# Patient Record
Sex: MALE | Race: WHITE | URBAN - METROPOLITAN AREA
[De-identification: names, ages, dates, MRNs, and addresses within clinical notes are randomized per-mention and may not be internally consistent; named-entity substitution may affect disease eponyms.]

---

## 2017-07-12 ENCOUNTER — THERAPY VISIT (OUTPATIENT)
Dept: PHYSICAL THERAPY | Facility: CLINIC | Age: 42
End: 2017-07-12
Payer: COMMERCIAL

## 2017-07-12 DIAGNOSIS — M25.511 ACUTE PAIN OF RIGHT SHOULDER: Primary | ICD-10-CM

## 2017-07-12 PROCEDURE — 97162 PT EVAL MOD COMPLEX 30 MIN: CPT | Mod: GP | Performed by: PHYSICAL THERAPIST

## 2017-07-12 PROCEDURE — 97110 THERAPEUTIC EXERCISES: CPT | Mod: GP | Performed by: PHYSICAL THERAPIST

## 2017-07-12 NOTE — PROGRESS NOTES
[facility] Initial Evaluation - Upper Extremity     Evaluation Date:  07/12/17  Referral: Dr Velez @ [facility]  Employment:  Marketing communications  Employment Status: full-time  Work Mechanical Stresses:  Prolonged sitting, computer work  Leisure Mechanical Stresses: table tennis, skateboarding, mountain biking, snowboarding  Baselines/Functional Disability: unable to lift or pour gallon of milk, unable to sleep on (L) side, unable to raise arm up out to the side   VAS Score (0-10):  6      HISTORY:   Patient presents with:  Intermittent sharp and achy pain  (Constant/Intermittent, Dull/Achy/Sharp/Stabbing/Throbbing)    Pain location:  Right lateral shoulder   Radiates to:  Upper arm occasionally   Paraesthesia:  no    Present Since: 5 weeks  Commenced as a result of: tim:  Dove for a ball and landed with his arm extended forwards  Status:  New and improving   (new/recurrent/chronic) and (improving/not improving/worsening)   Symptoms at onset: shoulder  Constant symptoms:  none  Intermittent symptoms: shoulder    Symptoms are made worse with: lifting, reaching overhead, lying on (L) side  (bending, sitting, turning neck, dressing, reaching, gripping; am, as the day progresses, pm; when still, when on the move; sleeping: prone, supine, side (R), side (L); other)   Symptoms are made better with:  rest  (bending, sitting, turning neck, dressing, reaching, gripping; am, as the day progresses, pm; when still, when on the move; sleeping: prone, supine, side (R), side (L); other)   Continued use makes the pain:  worse  (better, worse, no effect)  Disturbed night:  yes  Pain at rest:  yes  Previous Episodes:  0  Previous Treatments:  N/a  Handedness:  right    Specific Questions (as reported by the patient):  Do you have pain with coughing or sneezing:  no  Overall General Health:  Good/excellent  Imaging/Special testing:  MRI:  Labral tear from 12 o'clock to 3 o'clock  Recent or major surgery:   "Heart - arrhythmia surgery  Do you have night pain:  yes  Have you had any recent accidents:  no  Have you experienced any unexplained weight loss:  no  Pertinent medical history includes: none  Medical allergies includes: none  Current medications:  none  Barriers at home: unable to lift gallon of milk or anything with (R) hand   Red Flags:  no      Site(s) for physical examination: (R) shoulder       OBJECTIVE EXAMINATION:         AROM:  (Pain during motion: PDM; End-range Pain:  ERP)  MOVEMENT LOSS Right Left Pain   Flexion  WNL  ERP   Abduction  WNL  PDM at 90   External Rotation      Internal Rotation      Extension  75  ERP   Flexion/IR WNL  -   Extension/ER Lacking 1\" compared to (L)  ERP     PROM:    (Pain during motion: PDM; End-range Pain:  ERP)  MOVEMENT LOSS Right Left Pain   Flexion  WNL  ERP   Abduction   deg  PDM   External Rotation WNL  -   Internal Rotation 70  ERP     Resisted Testing:  (Pain during motion: PDM; End-range Pain:  ERP)   Right Left Pain   Flexion  5-  +   Abduction  deferred     External Rotation 5  -   Internal Rotation 5  -   Extension         Repeated Tests:     Pre-test symptoms:  Shoulder 6/10       Symptoms during        Symptoms after               Movement                   Movement:         Mechanical response:      Inc ROM Dec ROM  No effect   Extension P stretch NE     x   Flexion        Ext/IR P stretch NE X abd     Adduction         ER (in 90 deg flex)          Spine:  Movement Loss:  Not assessed   Spine testing:   Not relevant    Provisional Classification:  Peripheral:  Derangement/Other    Principle of Management:   Repeated shoulder ext/IR with belt: 4-5x/day; 10-15 reps       HPI                  System    Physical Exam    General     ROS    Assessment/Plan:      Patient is a 41 year old male with right side shoulder complaints.    Patient has the following significant findings with corresponding treatment plan.                Diagnosis 1:  Right Shoulder " Labral Tear   Pain -  manual therapy, self management, education, directional preference exercise and home program  Decreased ROM/flexibility - manual therapy, therapeutic exercise and home program  Decreased strength - therapeutic exercise, therapeutic activities and home program  Decreased function - therapeutic activities and home program    Therapy Evaluation Codes:   1) History comprised of:   Personal factors that impact the plan of care:      Overall behavior pattern.    Comorbidity factors that impact the plan of care are:      None.     Medications impacting care: None.  2) Examination of Body Systems comprised of:   Body structures and functions that impact the plan of care:      Shoulder.   Activity limitations that impact the plan of care are:      Cooking, Dressing, Lifting and Sports.  3) Clinical presentation characteristics are:   Evolving/Changing.  4) Decision-Making    Moderate complexity using standardized patient assessment instrument and/or measureable assessment of functional outcome.  Cumulative Therapy Evaluation is: Moderate complexity.    Previous and current functional limitations:  (See Goal Flow Sheet for this information)    Short term and Long term goals: (See Goal Flow Sheet for this information)     Communication ability:  Patient appears to be able to clearly communicate and understand verbal and written communication and follow directions correctly.  Treatment Explanation - The following has been discussed with the patient:   RX ordered/plan of care  Anticipated outcomes  Possible risks and side effects  This patient would benefit from PT intervention to resume normal activities.   Rehab potential is questionable.    Frequency:  1 X week, once daily  Duration:  for 4 weeks  Discharge Plan:  Achieve all LTG.  Independent in home treatment program.  Reach maximal therapeutic benefit.    Please refer to the daily flowsheet for treatment today, total treatment time and time spent  performing 1:1 timed codes.

## 2017-07-12 NOTE — LETTER
[facility] PHYSICAL THERAPY  [address]  [facility] [address]  [phone]    2017    Re: Basil Winters   :   1975  MRN:  [id number]   REFERRING PHYSICIAN:   Geovani Vivas    [facility] PHYSICAL THERAPY    Date of Initial Evaluation:  2017  Visits:   1  Reason for Referral:  Acute pain of right shoulder    [facility] Initial Evaluation - Upper Extremity   Evaluation Date:  17  Referral: Dr Vivas @ [facility]  Employment:  Marketing communications  Employment Status: full-time  Work Mechanical Stresses:  Prolonged sitting, computer work  Leisure Mechanical Stresses: table tennis, skateboarding, mountain biking, snowboarding  Baselines/Functional Disability: unable to lift or pour gallon of milk, unable to sleep on (L) side, unable to raise arm up out to the side   VAS Score (0-10):  6    HISTORY:   Patient presents with:  Intermittent sharp and achy pain  (Constant/Intermittent, Dull/Achy/Sharp/Stabbing/Throbbing)  Pain location:  Right lateral shoulder   Radiates to:  Upper arm occasionally   Paraesthesia:  no  Present Since: 5 weeks  Commenced as a result of: broomball:  Dove for a ball and landed with his arm extended forwards  Status:  New and improving   (new/recurrent/chronic) and (improving/not improving/worsening)   Symptoms at onset: shoulder  Constant symptoms:  none  Intermittent symptoms: shoulder  Symptoms are made worse with: lifting, reaching overhead, lying on (L) side  (bending, sitting, turning neck, dressing, reaching, gripping; am, as the day progresses, pm; when still, when on the move; sleeping: prone, supine, side (R), side (L); other)   Symptoms are made better with:  rest  (bending, sitting, turning neck, dressing, reaching, gripping; am, as the day progresses, pm; when still, when on the move; sleeping: prone, supine, side (R), side (L); other)   Continued use  "makes the pain:  worse  (better, worse, no effect)        Re: Basil Winters   :   1975      HISTORY (continued)  Disturbed night:  yes  Pain at rest:  yes  Previous Episodes:  0  Previous Treatments:  N/a  Handedness:  right    Specific Questions (as reported by the patient):  Do you have pain with coughing or sneezing:  no  Overall General Health:  Good/excellent  Imaging/Special testing:  MRI:  Labral tear from 12 o'clock to 3 o'clock  Recent or major surgery:  Heart - arrhythmia surgery  Do you have night pain:  yes  Have you had any recent accidents:  no  Have you experienced any unexplained weight loss:  no  Pertinent medical history includes: none  Medical allergies includes: none  Current medications:  none  Barriers at home: unable to lift gallon of milk or anything with (R) hand   Red Flags:  no    Site(s) for physical examination: (R) shoulder     OBJECTIVE EXAMINATION:     AROM:  (Pain during motion: PDM; End-range Pain:  ERP)  MOVEMENT LOSS Right Left Pain   Flexion  WNL  ERP   Abduction  WNL  PDM at 90   External Rotation      Internal Rotation      Extension  75  ERP   Flexion/IR WNL  -   Extension/ER Lacking 1\" compared to (L)  ERP   PROM:    (Pain during motion: PDM; End-range Pain:  ERP)  MOVEMENT LOSS Right Left Pain   Flexion  WNL  ERP   Abduction   deg  PDM   External Rotation WNL  -   Internal Rotation 70  ERP             Re: Basil Winters   :   1975    Resisted Testing:  (Pain during motion: PDM; End-range Pain:  ERP)   Right Left Pain   Flexion  5-  +   Abduction  deferred     External Rotation 5  -   Internal Rotation 5  -   Extension       Repeated Tests:   Pre-test symptoms:  Shoulder 6/10     Symptoms during        Symptoms after               Movement                   Movement:         Mechanical response:      Inc ROM Dec ROM  No effect   Extension P stretch NE     x   Flexion        Ext/IR P stretch NE X abd     Adduction         ER (in 90 deg flex)      "   Spine:  Movement Loss:  Not assessed   Spine testing:   Not relevant  Provisional Classification:  Peripheral:  Derangement/Other  Principle of Management:   Repeated shoulder ext/IR with belt: 4-5x/day; 10-15 reps     Assessment/Plan:    Patient is a 41 year old male with right side shoulder complaints.    Patient has the following significant findings with corresponding treatment plan.                Diagnosis 1:  Right Shoulder Labral Tear   Pain -  manual therapy, self management, education, directional preference exercise and home program  Decreased ROM/flexibility - manual therapy, therapeutic exercise and home program  Decreased strength - therapeutic exercise, therapeutic activities and home program  Decreased function - therapeutic activities and home program                              Re: Basil Winters   :   1975    Therapy Evaluation Codes:   1) History comprised of:   Personal factors that impact the plan of care:      Overall behavior pattern.    Comorbidity factors that impact the plan of care are:      None.     Medications impacting care: None.  2) Examination of Body Systems comprised of:   Body structures and functions that impact the plan of care:      Shoulder.   Activity limitations that impact the plan of care are:      Cooking, Dressing, Lifting and Sports.  3) Clinical presentation characteristics are:   Evolving/Changing.  4) Decision-Making    Moderate complexity using standardized patient assessment instrument and/or measureable assessment of functional outcome.  Cumulative Therapy Evaluation is: Moderate complexity.    Previous and current functional limitations:  (See Goal Flow Sheet for this information)    Short term and Long term goals: (See Goal Flow Sheet for this information)   Communication ability:  Patient appears to be able to clearly communicate and understand verbal and written communication and follow directions correctly.  Treatment Explanation - The following  has been discussed with the patient:   RX ordered/plan of care, Anticipated outcomes, Possible risks and side effects    This patient would benefit from PT intervention to resume normal activities.   Rehab potential is questionable.  Frequency:  1 X week, once daily  Duration:  for 4 weeks  Discharge Plan:  Achieve all LTG.  Independent in home treatment program.  Reach maximal therapeutic benefit.    Thank you for your referral.    INQUIRIES        Therapist:  Yaa De Luna DPT, cert MDT  [facility] PHYSICAL THERAPY  [address] [address]  Phone: [phone]  Fax: [phone]

## 2017-07-28 ENCOUNTER — THERAPY VISIT (OUTPATIENT)
Dept: PHYSICAL THERAPY | Facility: CLINIC | Age: 42
End: 2017-07-28
Payer: COMMERCIAL

## 2017-07-28 DIAGNOSIS — M25.511 ACUTE PAIN OF RIGHT SHOULDER: ICD-10-CM

## 2017-07-28 PROCEDURE — 97530 THERAPEUTIC ACTIVITIES: CPT | Mod: GP | Performed by: PHYSICAL THERAPIST

## 2017-07-28 PROCEDURE — 97110 THERAPEUTIC EXERCISES: CPT | Mod: GP | Performed by: PHYSICAL THERAPIST

## 2017-07-28 NOTE — LETTER
"[facility] ATHLETIC [facility] PHYSICAL THERAPY  [address]  [facility] [address]  [phone]    2017    Re: Basil Winters   :   1975  MRN:  [id number]   REFERRING PHYSICIAN:   Geovani Vivas    [facility]LETIC [facility] PHYSICAL THERAPY    Date of Initial Evaluation:  2017  Visits:   2  Reason for Referral:  Acute pain of right shoulder    PROGRESS  REPORT:  Progress reporting period is from 17 to 17.       SUBJECTIVE  Subjective changes noted by patient:  Patient states he is better.  States he is now able to lift his arm up out to the side.  However, unable to perform 30 push-ups  Pain is more consistent, but more of a dull ache and throb.  Has been performing HEP approx 3x/day.  Feels overall he has progressed 75-80%. Having a hard time with activity limitation/modification as he has still been playing [employer], table tennis, and going mountain biking.  States he would prefer not to have surgery completed but admits he does not think he will able to restrict or modify his activity enough to fully allow his shoulder to heal.  Admits he went mountain biking in [place] the other weekend; had significant amount of pain in shoulder as a result.    Current Pain level: 2/10 (8/10 at worst).   Initial Pain level: 9/10.   Changes in function:  Yes (See Goal flowsheet attached for changes in current functional level)  Adverse reaction to treatment or activity: activity - mountain biking; skate boarding - fell back onto elbow of right arm    OBJECTIVE  Changes noted in objective findings:  Yes, improved ROM and strength    AROM:  flex:  wnl (ERP), abd: PDM at 90; WNL, ext:  80 (-), flex/ER: wnl (-), ext/IR:  lacking 1 1 /2 \" (ERP).     MMT:  flex: 5/5 (-), abd:  4/5 (+)     ASSESSMENT/PLAN  Updated problem list and treatment plan: Diagnosis 1:  Right Shoulder Labral Tear  Pain -  manual therapy, self management, education, directional " preference exercise and home program  Decreased ROM/flexibility - manual therapy, therapeutic exercise and home program  Decreased strength - therapeutic exercise, therapeutic activities and home program  Decreased function - therapeutic activities and home program  STG/LTGs have been met or progress has been made towards goals:  Yes (See Goal flow sheet completed today.)  Assessment of Progress: Patient is meeting short term goals and is progressing towards long term goals.  Self Management Plans:  Patient has been instructed in a home treatment program.  Re: Basil Winters   :   1975    ASSESSMENT/PLAN (continued)  Patient is independent in a home treatment program.  Patient  has been instructed in self management of symptoms.  I have re-evaluated this patient and find that the nature, scope, duration and intensity of the therapy is appropriate for the medical condition of the patient.  Basil continues to require the following intervention to meet STG and LTG's:  PT    Recommendations:  This patient would benefit from continued therapy.     Frequency:  1 X a month, once daily  Duration:  for 2 months    Thank you for your referral.    INQUIRIES        Therapist:  SHAHID GuallpaT, cert MDT  [facility] PHYSICAL THERAPY  [address] [facility] [address]  [facility] [address]  Phone: [phone]  Fax: [phone]

## 2017-07-28 NOTE — PROGRESS NOTES
"Subjective:    HPI                    Objective:    System    Physical Exam    General     ROS    Assessment/Plan:      PROGRESS  REPORT    Progress reporting period is from 07/12/17 to 07/28/17.       SUBJECTIVE  Subjective changes noted by patient:  Patient states he is better.  States he is now able to lift his arm up out to the side.  However, unable to perform 30 push-ups  Pain is more consistent, but more of a dull ache and throb.  Has been performing HEP approx 3x/day .  Feels overall he has progressed 75-80%.   Having a hard time with activity limitation/modification as he has still been playing [employer], table tennis, and going mountain biking.  States he would prefer not to have surgery completed but admits he does not think he will able to restrict or modify his activity enough to fully allow his shoulder to heal.   Admits he went mountain biking in [place] the other weekend; had significant amount of pain in shoulder as a result.    Current Pain level: 2/10 (8/10 at worst).     Initial Pain level: 9/10.   Changes in function:  Yes (See Goal flowsheet attached for changes in current functional level)  Adverse reaction to treatment or activity: activity - mountain biking; skate boarding - fell back onto elbow of right arm    OBJECTIVE  Changes noted in objective findings:  Yes, improved ROM and strength    AROM:  flex:  wnl (ERP), abd: PDM at 90; WNL, ext:  80 (-), flex/ER: wnl (-), ext/IR:  lacking 1 1 /2 \" (ERP).     MMT:  flex: 5/5 (-), abd:  4/5 (+)     ASSESSMENT/PLAN  Updated problem list and treatment plan: Diagnosis 1:  Right Shoulder Labral Tear  Pain -  manual therapy, self management, education, directional preference exercise and home program  Decreased ROM/flexibility - manual therapy, therapeutic exercise and home program  Decreased strength - therapeutic exercise, therapeutic activities and home program  Decreased function - therapeutic activities and home program  STG/LTGs have been met " or progress has been made towards goals:  Yes (See Goal flow sheet completed today.)  Assessment of Progress: Patient is meeting short term goals and is progressing towards long term goals.  Self Management Plans:  Patient has been instructed in a home treatment program.  Patient is independent in a home treatment program.  Patient  has been instructed in self management of symptoms.  I have re-evaluated this patient and find that the nature, scope, duration and intensity of the therapy is appropriate for the medical condition of the patient.  Basil continues to require the following intervention to meet STG and LTG's:  PT    Recommendations:  This patient would benefit from continued therapy.     Frequency:  1 X a month, once daily  Duration:  for 2 months          Please refer to the daily flowsheet for treatment today, total treatment time and time spent performing 1:1 timed codes.

## 2017-10-19 ENCOUNTER — THERAPY VISIT (OUTPATIENT)
Dept: PHYSICAL THERAPY | Facility: CLINIC | Age: 42
End: 2017-10-19
Payer: COMMERCIAL

## 2017-10-19 DIAGNOSIS — Z47.89 AFTERCARE FOLLOWING SURGERY OF THE MUSCULOSKELETAL SYSTEM: ICD-10-CM

## 2017-10-19 DIAGNOSIS — M25.511 ACUTE PAIN OF RIGHT SHOULDER: Primary | ICD-10-CM

## 2017-10-19 PROCEDURE — 97110 THERAPEUTIC EXERCISES: CPT | Mod: GP | Performed by: PHYSICAL THERAPIST

## 2017-10-19 PROCEDURE — 97161 PT EVAL LOW COMPLEX 20 MIN: CPT | Mod: GP | Performed by: PHYSICAL THERAPIST

## 2017-10-19 NOTE — LETTER
[facility] ATHLETIC [facility] PHYSICAL THERAPY  [address]  [facility] [address]  [phone]    2017    Re: Basil Winters   :   1975  MRN:  [id number]   REFERRING PHYSICIAN:   Geovani Vivas    [facility] ATHLETIC [facility] PHYSICAL THERAPY  Date of Initial Evaluation:  10/19/2017  Visits:   1  Reason for Referral:     Acute pain of right shoulder  Aftercare following surgery of the musculoskeletal system  [facility] Initial Evaluation -- Upper Extremity  Evaluation Date: 2017  Basil Winters is a 42 year old male with a (R) shoulder condition.   Referral: Ortho  Work mechanical stresses: Design - Prolonged sitting, lifting/carrying, computer  Employment status:  Working normal hours  Leisure mechanical stresses: Broomball, mountain bike/trails, skate boarding  Functional disability score (SPADI): See flowsheet  VAS score (0-10): 2-8/10  Handedness (R/L):  Right  Patient goals/expectations:  Improve mobility and strength following surgery    HISTORY  Present symptoms: (R) ant lat shoulder.    Pain quality (sharp/shooting/stabbing/aching/burning/cramping):  Achy/sharp  Present since (onset date):  3 months.  Surgery date 17 (labral repair)    Symptoms (improving/unchanging/worsening):  improving.  Symptoms commenced as a result of: Playing broom ball, over extended arm while falling   Condition occurred in the following environment: During recreation activity  Symptoms at onset: As above    Paresthesia (yes/no):  No  Spinal history: No     Cough/Sneeze (pos/neg):  Neg  Constant symptoms: None  Intermittent symptoms: As above  Symptoms are worse with the following: Always Reaching, Always On the move, Time of day - No effect and Other - Lifting   Symptoms are better with the following: Always When still and Other - Ibuprofen  Continued use makes the pain (better/worse/no effect): NA  Disturbed night (yes/no):   No  Pain at rest (yes/no): No    Site (neck/shoulder/elbow/wrist/hand): NA  Other questions (swelling/catching/clicking/locking/subluxing):  None  Previous episodes: No  Previous treatments: PT x 2 visits - NE  Re: Basil Winters   :   1975    Specific Questions:  General health (excellent/good/fair/poor):  Good  Pertinent medical history includes: Heart problems  Medications (nil/NSAIDS/analg/steroids/anticoag/other):  NSAIDS  Medical allergies:  None  Imaging (None/Xray/MRI/Other): MRI   Recent or major surgery (yes/no): None  Night pain (yes/no): No  Accidents (yes/no): No  Unexplained weight loss (yes/no): No  Barriers at home: None  Other red flags: None    Sites for physical examination (neck/shoulder/elbow/wrist/hand): Shoulder    EXAMINATION  Posture:  Sitting (good/fair/poor): NA  Correction of posture (better/worse/no effect/NA): NA  Standing (good/fair/poor): NA  Other observations:  NA  Neurological (NA/motor/sensory/reflexes/dural): NA  Baselines (pain or functional activity): Limited reaching overhead, out to side, behind back/head  Extremities (Shoulder/Elbow/Wrist/Hand): Shoulder  Movement Loss Christ Mod Min Nil Pain   AROM not assessed due to post surgical restrictions        Passive Movement (+/- overpressure)/(PDM/ERP):  PROM:  Flex 100; Abd 145; ER 75 deg @ 45 deg  Resisted Test Response (pain): NT due to post surgical status  Other Tests: NT    Spine:  Movement Loss: NT  Effect of repeated movements: NT  Effect of static positioning: NT  Spine testing (not relevant/relevant/secondary problem): Not relevant                    Re: Basil Winters   :   1975    Baseline Symptoms: NA  Repeated Tests Symptom Response Mechanical Response   Active/Passive movement, resisted test, functional test During - Produce, Abolish, Increase, Decrease, NE After -   Better, Worse, NB, NW, NE Effect -   ? or ? ROM, strength or key functional test No Effect   NA due to post surgical status       Effect  of static positioning       NA       Provisional Classification (Extremity/Spine): Extremity - Inconclusive/Other - Post-Surgery    Principle of Management:  Education:  Treatment progression post surgery  Equipment provided:  None  Exercise and dosage:  Progression AAROM > AROM > Strengthening per MD protocol.    ASSESSMENT/PLAN:  Patient is a 42 year old male with right side shoulder complaints.    Patient has the following significant findings with corresponding treatment plan.                Diagnosis 1:  S/P (R) Shoulder Labral Repair    Pain -  self management, education, directional preference exercise and home program  Decreased ROM/flexibility - manual therapy, therapeutic exercise and home program  Decreased joint mobility - manual therapy, therapeutic exercise and home program  Decreased strength - therapeutic exercise and therapeutic activities  Impaired muscle performance - electric stimulation, neuro re-education and home program  Decreased function - therapeutic activities and home program    Therapy Evaluation Codes:   1) History comprised of:   Personal factors that impact the plan of care:      None.    Comorbidity factors that impact the plan of care are:      Heart problems.     Medications impacting care: None.  2) Examination of Body Systems comprised of:   Body structures and functions that impact the plan of care:      Shoulder.   Activity limitations that impact the plan of care are:      Lifting and Reaching.  3) Clinical presentation characteristics are:   Stable/Uncomplicated.  4) Decision-Making    Low complexity using standardized patient assessment instrument and/or measureable assessment of functional outcome.  Cumulative Therapy Evaluation is: Low complexity.      Re: Basil Winters   :   1975  Previous and current functional limitations:  (See Goal Flow Sheet for this information)    Short term and Long term goals: (See Goal Flow Sheet for this information)   Communication  ability:  Patient appears to be able to clearly communicate and understand verbal and written communication and follow directions correctly.  Treatment Explanation - The following has been discussed with the patient:   RX ordered/plan of care, Anticipated outcomes, Possible risks and side effects    This patient would benefit from PT intervention to resume normal activities.   Rehab potential is good.  Frequency:  2 X week, once daily  Duration:  for 2 weeks tapering to 1 x week x 6 weeks  Discharge Plan:  Achieve all LTG.  Independent in home treatment program.  Reach maximal therapeutic benefit.    Thank you for your referral.    INQUIRIES      Therapist:   SHAHID MittalT, cert MDT   [facility] PHYSICAL THERAPY  [address] [facility] [address]  [facility] [address]  Phone: [phone]  Fax: [phone]

## 2017-10-19 NOTE — PROGRESS NOTES
[facility] Initial Evaluation -- Upper Extremity    Evaluation Date: October 19, 2017  Basil Winters is a 42 year old male with a (R) shoulder condition.   Referral: Ortho  Work mechanical stresses: Design - Prolonged sitting, lifting/carrying, computer  Employment status:  Working normal hours  Leisure mechanical stresses: Broomball, mountain bike/trails, skate boarding  Functional disability score (SPADI): See flowsheet  VAS score (0-10): 2-8/10  Handedness (R/L):  Right  Patient goals/expectations:  Improve mobility and strength following surgery    HISTORY    Present symptoms: (R) ant lat shoulder.    Pain quality (sharp/shooting/stabbing/aching/burning/cramping):  Achy/sharp    Present since (onset date):  3 months.  Surgery date 9.28.17 (labral repair)    Symptoms (improving/unchanging/worsening):  improving.    Symptoms commenced as a result of: Playing broom ball, over extended arm while falling   Condition occurred in the following environment: During recreation activity    Symptoms at onset: As above  Paresthesia (yes/no):  No  Spinal history: No   Cough/Sneeze (pos/neg):  Neg    Constant symptoms: None  Intermittent symptoms: As above    Symptoms are worse with the following: Always Reaching, Always On the move, Time of day - No effect and Other - Lifting   Symptoms are better with the following: Always When still and Other - Ibuprofen    Continued use makes the pain (better/worse/no effect): NA    Disturbed night (yes/no):  No    Pain at rest (yes/no): No  Site (neck/shoulder/elbow/wrist/hand): NA    Other questions (swelling/catching/clicking/locking/subluxing):  None    Previous episodes: No  Previous treatments: PT x 2 visits - NE    Specific Questions:  General health (excellent/good/fair/poor):  Good  Pertinent medical history includes: Heart problems  Medications (nil/NSAIDS/analg/steroids/anticoag/other):  NSAIDS  Medical allergies:  None  Imaging (None/Xray/MRI/Other): MRI    Recent or major surgery (yes/no): None  Night pain (yes/no): No  Accidents (yes/no): No  Unexplained weight loss (yes/no): No  Barriers at home: None  Other red flags: None    Sites for physical examination (neck/shoulder/elbow/wrist/hand): Shoulder    EXAMINATION    Posture:  Sitting (good/fair/poor): NA  Correction of posture (better/worse/no effect/NA): NA  Standing (good/fair/poor): NA  Other observations:  NA    Neurological (NA/motor/sensory/reflexes/dural): NA    Baselines (pain or functional activity): Limited reaching overhead, out to side, behind back/head    Extremities (Shoulder/Elbow/Wrist/Hand): Shoulder    Movement Loss Christ Mod Min Nil Pain   AROM not assessed due to post surgical restrictions           Passive Movement (+/- overpressure)/(PDM/ERP):  PROM:  Flex 100; Abd 145; ER 75 deg @ 45 deg  Resisted Test Response (pain): NT due to post surgical status  Other Tests: NT    Spine:  Movement Loss: NT  Effect of repeated movements: NT  Effect of static positioning: NT  Spine testing (not relevant/relevant/secondary problem): Not relevant    Baseline Symptoms: NA  Repeated Tests Symptom Response Mechanical Response   Active/Passive movement, resisted test, functional test During - Produce, Abolish, Increase, Decrease, NE After -   Better, Worse, NB, NW, NE Effect -   ? or ? ROM, strength or key functional test No Effect   NA due to post surgical status       Effect of static positioning       NA           Provisional Classification (Extremity/Spine): Extremity - Inconclusive/Other - Post-Surgery      Principle of Management:  Education:  Treatment progression post surgery  Equipment provided:  None  Exercise and dosage:  Progression AAROM > AROM > Strengthening per MD protocol.    ASSESSMENT/PLAN:    Patient is a 42 year old male with right side shoulder complaints.    Patient has the following significant findings with corresponding treatment plan.                Diagnosis 1:  S/P (R) Shoulder  Labral Repair    Pain -  self management, education, directional preference exercise and home program  Decreased ROM/flexibility - manual therapy, therapeutic exercise and home program  Decreased joint mobility - manual therapy, therapeutic exercise and home program  Decreased strength - therapeutic exercise and therapeutic activities  Impaired muscle performance - electric stimulation, neuro re-education and home program  Decreased function - therapeutic activities and home program    Therapy Evaluation Codes:   1) History comprised of:   Personal factors that impact the plan of care:      None.    Comorbidity factors that impact the plan of care are:      Heart problems.     Medications impacting care: None.  2) Examination of Body Systems comprised of:   Body structures and functions that impact the plan of care:      Shoulder.   Activity limitations that impact the plan of care are:      Lifting and Reaching.  3) Clinical presentation characteristics are:   Stable/Uncomplicated.  4) Decision-Making    Low complexity using standardized patient assessment instrument and/or measureable assessment of functional outcome.  Cumulative Therapy Evaluation is: Low complexity.    Previous and current functional limitations:  (See Goal Flow Sheet for this information)    Short term and Long term goals: (See Goal Flow Sheet for this information)     Communication ability:  Patient appears to be able to clearly communicate and understand verbal and written communication and follow directions correctly.  Treatment Explanation - The following has been discussed with the patient:   RX ordered/plan of care  Anticipated outcomes  Possible risks and side effects  This patient would benefit from PT intervention to resume normal activities.   Rehab potential is good.    Frequency:  2 X week, once daily  Duration:  for 2 weeks tapering to 1 x week x 6 weeks  Discharge Plan:  Achieve all LTG.  Independent in home treatment  program.  Reach maximal therapeutic benefit.    Please refer to the daily flowsheet for treatment today, total treatment time and time spent performing 1:1 timed codes.

## 2017-10-23 ENCOUNTER — THERAPY VISIT (OUTPATIENT)
Dept: PHYSICAL THERAPY | Facility: CLINIC | Age: 42
End: 2017-10-23
Payer: COMMERCIAL

## 2017-10-23 DIAGNOSIS — M25.511 ACUTE PAIN OF RIGHT SHOULDER: ICD-10-CM

## 2017-10-23 DIAGNOSIS — Z47.89 AFTERCARE FOLLOWING SURGERY OF THE MUSCULOSKELETAL SYSTEM: ICD-10-CM

## 2017-10-23 PROCEDURE — 97110 THERAPEUTIC EXERCISES: CPT | Mod: GP | Performed by: PHYSICAL THERAPIST

## 2017-10-25 ENCOUNTER — THERAPY VISIT (OUTPATIENT)
Dept: PHYSICAL THERAPY | Facility: CLINIC | Age: 42
End: 2017-10-25
Payer: COMMERCIAL

## 2017-10-25 DIAGNOSIS — Z47.89 AFTERCARE FOLLOWING SURGERY OF THE MUSCULOSKELETAL SYSTEM: ICD-10-CM

## 2017-10-25 DIAGNOSIS — M25.511 ACUTE PAIN OF RIGHT SHOULDER: ICD-10-CM

## 2017-10-25 PROCEDURE — 97110 THERAPEUTIC EXERCISES: CPT | Mod: GP | Performed by: PHYSICAL THERAPY ASSISTANT

## 2017-11-02 ENCOUNTER — THERAPY VISIT (OUTPATIENT)
Dept: PHYSICAL THERAPY | Facility: CLINIC | Age: 42
End: 2017-11-02
Payer: COMMERCIAL

## 2017-11-02 DIAGNOSIS — Z47.89 AFTERCARE FOLLOWING SURGERY OF THE MUSCULOSKELETAL SYSTEM: ICD-10-CM

## 2017-11-02 DIAGNOSIS — M25.511 ACUTE PAIN OF RIGHT SHOULDER: ICD-10-CM

## 2017-11-02 PROCEDURE — 97110 THERAPEUTIC EXERCISES: CPT | Mod: GP | Performed by: PHYSICAL THERAPY ASSISTANT

## 2017-11-02 PROCEDURE — 97112 NEUROMUSCULAR REEDUCATION: CPT | Mod: GP | Performed by: PHYSICAL THERAPY ASSISTANT

## 2017-11-07 ENCOUNTER — THERAPY VISIT (OUTPATIENT)
Dept: PHYSICAL THERAPY | Facility: CLINIC | Age: 42
End: 2017-11-07
Payer: COMMERCIAL

## 2017-11-07 DIAGNOSIS — Z47.89 AFTERCARE FOLLOWING SURGERY OF THE MUSCULOSKELETAL SYSTEM: ICD-10-CM

## 2017-11-07 DIAGNOSIS — M25.511 ACUTE PAIN OF RIGHT SHOULDER: ICD-10-CM

## 2017-11-07 PROCEDURE — 97112 NEUROMUSCULAR REEDUCATION: CPT | Mod: GP | Performed by: PHYSICAL THERAPIST

## 2017-11-07 PROCEDURE — 97110 THERAPEUTIC EXERCISES: CPT | Mod: GP | Performed by: PHYSICAL THERAPIST

## 2017-11-13 ENCOUNTER — THERAPY VISIT (OUTPATIENT)
Dept: PHYSICAL THERAPY | Facility: CLINIC | Age: 42
End: 2017-11-13
Payer: COMMERCIAL

## 2017-11-13 DIAGNOSIS — M25.511 ACUTE PAIN OF RIGHT SHOULDER: ICD-10-CM

## 2017-11-13 DIAGNOSIS — Z47.89 AFTERCARE FOLLOWING SURGERY OF THE MUSCULOSKELETAL SYSTEM: ICD-10-CM

## 2017-11-13 PROCEDURE — 97110 THERAPEUTIC EXERCISES: CPT | Mod: GP | Performed by: PHYSICAL THERAPIST

## 2017-11-13 PROCEDURE — 97112 NEUROMUSCULAR REEDUCATION: CPT | Mod: GP | Performed by: PHYSICAL THERAPIST

## 2017-11-20 ENCOUNTER — THERAPY VISIT (OUTPATIENT)
Dept: PHYSICAL THERAPY | Facility: CLINIC | Age: 42
End: 2017-11-20
Payer: COMMERCIAL

## 2017-11-20 DIAGNOSIS — M25.511 ACUTE PAIN OF RIGHT SHOULDER: ICD-10-CM

## 2017-11-20 DIAGNOSIS — Z47.89 AFTERCARE FOLLOWING SURGERY OF THE MUSCULOSKELETAL SYSTEM: ICD-10-CM

## 2017-11-20 PROCEDURE — 97110 THERAPEUTIC EXERCISES: CPT | Mod: GP | Performed by: PHYSICAL THERAPIST

## 2017-11-20 PROCEDURE — 97112 NEUROMUSCULAR REEDUCATION: CPT | Mod: GP | Performed by: PHYSICAL THERAPIST

## 2017-11-27 ENCOUNTER — THERAPY VISIT (OUTPATIENT)
Dept: PHYSICAL THERAPY | Facility: CLINIC | Age: 42
End: 2017-11-27
Payer: COMMERCIAL

## 2017-11-27 DIAGNOSIS — M25.511 ACUTE PAIN OF RIGHT SHOULDER: ICD-10-CM

## 2017-11-27 DIAGNOSIS — Z47.89 AFTERCARE FOLLOWING SURGERY OF THE MUSCULOSKELETAL SYSTEM: ICD-10-CM

## 2017-11-27 PROCEDURE — 97110 THERAPEUTIC EXERCISES: CPT | Mod: GP | Performed by: PHYSICAL THERAPY ASSISTANT

## 2017-11-27 PROCEDURE — 97112 NEUROMUSCULAR REEDUCATION: CPT | Mod: GP | Performed by: PHYSICAL THERAPY ASSISTANT

## 2017-12-04 ENCOUNTER — THERAPY VISIT (OUTPATIENT)
Dept: PHYSICAL THERAPY | Facility: CLINIC | Age: 42
End: 2017-12-04
Payer: COMMERCIAL

## 2017-12-04 DIAGNOSIS — M25.511 ACUTE PAIN OF RIGHT SHOULDER: ICD-10-CM

## 2017-12-04 DIAGNOSIS — Z47.89 AFTERCARE FOLLOWING SURGERY OF THE MUSCULOSKELETAL SYSTEM: ICD-10-CM

## 2017-12-04 PROCEDURE — 97112 NEUROMUSCULAR REEDUCATION: CPT | Mod: GP | Performed by: PHYSICAL THERAPIST

## 2017-12-04 PROCEDURE — 97110 THERAPEUTIC EXERCISES: CPT | Mod: GP | Performed by: PHYSICAL THERAPIST

## 2017-12-11 ENCOUNTER — THERAPY VISIT (OUTPATIENT)
Dept: PHYSICAL THERAPY | Facility: CLINIC | Age: 42
End: 2017-12-11
Payer: COMMERCIAL

## 2017-12-11 DIAGNOSIS — Z47.89 AFTERCARE FOLLOWING SURGERY OF THE MUSCULOSKELETAL SYSTEM: ICD-10-CM

## 2017-12-11 DIAGNOSIS — M25.511 ACUTE PAIN OF RIGHT SHOULDER: ICD-10-CM

## 2017-12-11 PROCEDURE — 97110 THERAPEUTIC EXERCISES: CPT | Mod: GP | Performed by: PHYSICAL THERAPIST

## 2018-01-09 ENCOUNTER — THERAPY VISIT (OUTPATIENT)
Dept: PHYSICAL THERAPY | Facility: CLINIC | Age: 43
End: 2018-01-09
Payer: COMMERCIAL

## 2018-01-09 DIAGNOSIS — Z47.89 AFTERCARE FOLLOWING SURGERY OF THE MUSCULOSKELETAL SYSTEM: ICD-10-CM

## 2018-01-09 DIAGNOSIS — M25.511 ACUTE PAIN OF RIGHT SHOULDER: ICD-10-CM

## 2018-01-09 PROCEDURE — 97112 NEUROMUSCULAR REEDUCATION: CPT | Mod: GP | Performed by: PHYSICAL THERAPIST

## 2018-01-09 PROCEDURE — 97110 THERAPEUTIC EXERCISES: CPT | Mod: GP | Performed by: PHYSICAL THERAPIST

## 2018-01-09 NOTE — PROGRESS NOTES
"Subjective:  HPI                    Objective:  System    Physical Exam    General     ROS    Assessment/Plan:    PROGRESS  REPORT    Progress reporting period is from 10.19.17 to 1.9.18.       SUBJECTIVE  Subjective changes noted by patient:  Pt reports shoulder is doing well.  Still gets sharp twinge of pain in anterior shoulder running down mid arm when sitting but which change of position pain goes away.  Does not feel restricted with day to day activities but feels still some weakness with doing strengthening exercises ie push ups.    Current Pain level: 0/10.     Initial Pain level: 8/10.   Changes in function:  Yes (See Goal flowsheet attached for changes in current functional level)  Adverse reaction to treatment or activity: None    OBJECTIVE  AROM:  Flex WNL/PDM mid range; Abd WNL/ERP; Flex/ER WNL; Ext/IR 2\" difference.    Strength:  FF 4+, painful; Abd 5/5; ER 5/5; IR 5/5.    Repeated movements:  Ext/IR w/wand - NE, NE, Inc ROM (Ext/IR), NE pain active Flex, resisted Flex.  Repeated extension - NE, NE, Dec pain active flex and resisted flex, improved strength (Flex and push ups easier)     ASSESSMENT/PLAN  Updated problem list and treatment plan: Diagnosis 1:  S/P (R) Sh Labral Repair    Pain -  self management, education, directional preference exercise and home program  Decreased ROM/flexibility - manual therapy, therapeutic exercise and home program  Decreased joint mobility - manual therapy, therapeutic exercise and home program  Impaired muscle performance - neuro re-education and home program  STG/LTGs have been met or progress has been made towards goals:  Yes (See Goal flow sheet completed today.)  Assessment of Progress: The patient's condition is improving.  Self Management Plans:  Patient is independent in a home treatment program.  Patient is independent in self management of symptoms.  I have re-evaluated this patient and find that the nature, scope, duration and intensity of the therapy is " appropriate for the medical condition of the patient.  Basil continues to require the following intervention to meet STG and LTG's:  PT intervention is no longer required to meet STG/LTG.    Recommendations:  Pt will continue with HEP as instructed.  Follow up prn over the next 6 weeks if problems arise/symptoms worsen.  D/C at that time if pt does not return and this progress note to serve as D/C status.      Please refer to the daily flowsheet for treatment today, total treatment time and time spent performing 1:1 timed codes.

## 2018-01-09 NOTE — LETTER
"[facility] ATHLETIC [place] [facility] PHYSICAL THERAPY  [address]  [facility] [address]  [phone]    2018    Re: Basil Winters   :   1975  MRN:  [id number]   REFERRING PHYSICIAN:   Geovani Vivas    [facility] ATHLETIC [place] [facility] PHYSICAL THERAPY    Date of Initial Evaluation:  10/19/2017  Visits: 11  Reason for Referral:     Aftercare following surgery of the musculoskeletal system  Acute pain of right shoulder    PROGRESS  REPORT: Progress reporting period is from 10.19.17 to 18.       SUBJECTIVE  Subjective changes noted by patient:  Pt reports shoulder is doing well.  Still gets sharp twinge of pain in anterior shoulder running down mid arm when sitting but which change of position pain goes away.  Does not feel restricted with day to day activities but feels still some weakness with doing strengthening exercises ie push ups.    Current Pain level: 0/10.   Initial Pain level: 8/10.   Changes in function:  Yes (See Goal flowsheet attached for changes in current functional level). Adverse reaction to treatment or activity: None    OBJECTIVE  AROM:  Flex WNL/PDM mid range; Abd WNL/ERP; Flex/ER WNL; Ext/IR 2\" difference.    Strength:  FF 4+, painful; Abd 5/5; ER 5/5; IR 5/5.    Repeated movements:  Ext/IR w/wand - NE, NE, Inc ROM (Ext/IR), NE pain active Flex, resisted Flex.  Repeated extension - NE, NE, Dec pain active flex and resisted flex, improved strength (Flex and push ups easier)     ASSESSMENT/PLAN  Updated problem list and treatment plan: Diagnosis 1:  S/P (R) Sh Labral Repair  Pain -  self management, education, directional preference exercise and home program  Decreased ROM/flexibility - manual therapy, therapeutic exercise and home program  Decreased joint mobility - manual therapy, therapeutic exercise and home program  Impaired muscle performance - neuro re-education and home program  STG/LTGs have been met or progress has been made " towards goals:  Yes (See Goal flow sheet completed today.)  Assessment of Progress: The patient's condition is improving.  Self Management Plans:  Patient is independent in a home treatment program.  Patient is independent in self management of symptoms.  I have re-evaluated this patient and find that the nature, scope, duration and intensity of the therapy is appropriate for the medical condition of the patient.  Basil continues to require the following intervention to meet STG and LTG's:  PT intervention is no longer required to meet STG/LTG.      Re: Basil BERRY Singh   :   1975    Recommendations:  Pt will continue with HEP as instructed.  Follow up prn over the next 6 weeks if problems arise/symptoms worsen.  D/C at that time if pt does not return and this progress note to serve as D/C status.    Thank you for your referral.    INQUIRIES        Therapist:  Carlito Mckinley DPT, cert MDT  [facility] PHYSICAL THERAPY  [address] [facility] [address]  [facility] [address]  Phone: [phone]  Fax: [phone]

## 2018-02-19 PROBLEM — Z47.89 AFTERCARE FOLLOWING SURGERY OF THE MUSCULOSKELETAL SYSTEM: Status: RESOLVED | Noted: 2017-10-19 | Resolved: 2018-02-19

## 2018-02-19 PROBLEM — M25.511 ACUTE PAIN OF RIGHT SHOULDER: Status: RESOLVED | Noted: 2017-07-12 | Resolved: 2018-02-19

## 2022-11-28 ENCOUNTER — TELEPHONE (OUTPATIENT)
Dept: INTERNAL MEDICINE | Facility: CLINIC | Age: 47
End: 2022-11-28
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
## 2024-10-22 ENCOUNTER — DOCUMENTATION ONLY (OUTPATIENT)
Dept: OTHER | Facility: CLINIC | Age: 49
End: 2024-10-22
Payer: COMMERCIAL